# Patient Record
Sex: FEMALE | Race: WHITE | Employment: OTHER | URBAN - METROPOLITAN AREA
[De-identification: names, ages, dates, MRNs, and addresses within clinical notes are randomized per-mention and may not be internally consistent; named-entity substitution may affect disease eponyms.]

---

## 2020-06-02 ENCOUNTER — HOSPITAL ENCOUNTER (EMERGENCY)
Age: 50
Discharge: HOME | End: 2020-06-02
Attending: EMERGENCY MEDICINE
Payer: COMMERCIAL

## 2020-06-02 ENCOUNTER — APPOINTMENT (OUTPATIENT)
Dept: GENERAL RADIOLOGY | Age: 50
End: 2020-06-02
Payer: COMMERCIAL

## 2020-06-02 VITALS
SYSTOLIC BLOOD PRESSURE: 132 MMHG | HEIGHT: 69 IN | TEMPERATURE: 98 F | WEIGHT: 175 LBS | OXYGEN SATURATION: 99 % | HEART RATE: 65 BPM | DIASTOLIC BLOOD PRESSURE: 70 MMHG | BODY MASS INDEX: 25.92 KG/M2 | RESPIRATION RATE: 16 BRPM

## 2020-06-02 PROCEDURE — 6370000000 HC RX 637 (ALT 250 FOR IP): Performed by: EMERGENCY MEDICINE

## 2020-06-02 PROCEDURE — 71046 X-RAY EXAM CHEST 2 VIEWS: CPT

## 2020-06-02 PROCEDURE — 99283 EMERGENCY DEPT VISIT LOW MDM: CPT

## 2020-06-02 RX ORDER — LIDOCAINE 4 G/G
1 PATCH TOPICAL DAILY
Status: DISCONTINUED | OUTPATIENT
Start: 2020-06-02 | End: 2020-06-02 | Stop reason: HOSPADM

## 2020-06-02 RX ORDER — IBUPROFEN 600 MG/1
600 TABLET ORAL EVERY 6 HOURS PRN
Qty: 30 TABLET | Refills: 0 | Status: SHIPPED | OUTPATIENT
Start: 2020-06-02

## 2020-06-02 RX ORDER — LIDOCAINE 50 MG/G
1 PATCH TOPICAL DAILY
Qty: 30 PATCH | Refills: 0 | Status: SHIPPED | OUTPATIENT
Start: 2020-06-02

## 2020-06-02 RX ORDER — IBUPROFEN 400 MG/1
800 TABLET ORAL ONCE
Status: COMPLETED | OUTPATIENT
Start: 2020-06-02 | End: 2020-06-02

## 2020-06-02 RX ADMIN — IBUPROFEN 800 MG: 400 TABLET, FILM COATED ORAL at 15:48

## 2020-06-02 ASSESSMENT — PAIN - FUNCTIONAL ASSESSMENT: PAIN_FUNCTIONAL_ASSESSMENT: ACTIVITIES ARE NOT PREVENTED

## 2020-06-02 ASSESSMENT — PAIN DESCRIPTION - PAIN TYPE: TYPE: ACUTE PAIN

## 2020-06-02 ASSESSMENT — PAIN SCALES - GENERAL
PAINLEVEL_OUTOF10: 8
PAINLEVEL_OUTOF10: 8

## 2020-06-02 ASSESSMENT — PAIN DESCRIPTION - DESCRIPTORS: DESCRIPTORS: ACHING;SORE

## 2020-06-02 ASSESSMENT — PAIN DESCRIPTION - ONSET: ONSET: SUDDEN

## 2020-06-02 ASSESSMENT — PAIN DESCRIPTION - PROGRESSION: CLINICAL_PROGRESSION: GRADUALLY WORSENING
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
## 2020-12-12 ENCOUNTER — APPOINTMENT (OUTPATIENT)
Dept: GENERAL RADIOLOGY | Age: 50
End: 2020-12-12
Payer: COMMERCIAL

## 2020-12-12 ENCOUNTER — HOSPITAL ENCOUNTER (EMERGENCY)
Age: 50
Discharge: HOME | End: 2020-12-12
Attending: STUDENT IN AN ORGANIZED HEALTH CARE EDUCATION/TRAINING PROGRAM
Payer: COMMERCIAL

## 2020-12-12 VITALS
OXYGEN SATURATION: 100 % | WEIGHT: 170 LBS | BODY MASS INDEX: 25.18 KG/M2 | TEMPERATURE: 98.9 F | SYSTOLIC BLOOD PRESSURE: 145 MMHG | HEART RATE: 59 BPM | HEIGHT: 69 IN | RESPIRATION RATE: 18 BRPM | DIASTOLIC BLOOD PRESSURE: 94 MMHG

## 2020-12-12 PROCEDURE — 6370000000 HC RX 637 (ALT 250 FOR IP): Performed by: STUDENT IN AN ORGANIZED HEALTH CARE EDUCATION/TRAINING PROGRAM

## 2020-12-12 PROCEDURE — 73562 X-RAY EXAM OF KNEE 3: CPT

## 2020-12-12 PROCEDURE — 73610 X-RAY EXAM OF ANKLE: CPT

## 2020-12-12 PROCEDURE — 73630 X-RAY EXAM OF FOOT: CPT

## 2020-12-12 PROCEDURE — 99284 EMERGENCY DEPT VISIT MOD MDM: CPT

## 2020-12-12 RX ORDER — GINSENG 100 MG
CAPSULE ORAL ONCE
Status: COMPLETED | OUTPATIENT
Start: 2020-12-12 | End: 2020-12-12

## 2020-12-12 RX ORDER — OXYCODONE HYDROCHLORIDE 5 MG/1
5 TABLET ORAL EVERY 6 HOURS PRN
Qty: 10 TABLET | Refills: 0 | Status: SHIPPED | OUTPATIENT
Start: 2020-12-12 | End: 2020-12-15

## 2020-12-12 RX ORDER — OSTOMY ADHESIVE
1 STRIP MISCELLANEOUS ONCE
Status: DISCONTINUED | OUTPATIENT
Start: 2020-12-12 | End: 2020-12-12

## 2020-12-12 RX ORDER — ACETAMINOPHEN 325 MG/1
650 TABLET ORAL ONCE
Status: COMPLETED | OUTPATIENT
Start: 2020-12-12 | End: 2020-12-12

## 2020-12-12 RX ORDER — OXYCODONE HYDROCHLORIDE 5 MG/1
5 TABLET ORAL ONCE
Status: COMPLETED | OUTPATIENT
Start: 2020-12-12 | End: 2020-12-12

## 2020-12-12 RX ADMIN — BACITRACIN: 500 OINTMENT TOPICAL at 18:15

## 2020-12-12 RX ADMIN — OXYCODONE HYDROCHLORIDE 5 MG: 5 TABLET ORAL at 17:43

## 2020-12-12 RX ADMIN — ACETAMINOPHEN 650 MG: 325 TABLET ORAL at 16:37

## 2020-12-12 ASSESSMENT — PAIN SCALES - GENERAL
PAINLEVEL_OUTOF10: 9

## 2020-12-12 ASSESSMENT — ENCOUNTER SYMPTOMS
EYES NEGATIVE: 1
GASTROINTESTINAL NEGATIVE: 1
SHORTNESS OF BREATH: 0
RESPIRATORY NEGATIVE: 1
ALLERGIC/IMMUNOLOGIC NEGATIVE: 1
COUGH: 0

## 2020-12-12 ASSESSMENT — PAIN DESCRIPTION - ORIENTATION: ORIENTATION: LEFT

## 2020-12-12 ASSESSMENT — PAIN DESCRIPTION - LOCATION
LOCATION: KNEE
LOCATION_2: FOOT

## 2020-12-12 ASSESSMENT — PAIN DESCRIPTION - INTENSITY: RATING_2: 7

## 2020-12-12 ASSESSMENT — PAIN DESCRIPTION - DESCRIPTORS: DESCRIPTORS_2: ACHING

## 2020-12-12 ASSESSMENT — PAIN DESCRIPTION - PAIN TYPE: TYPE: ACUTE PAIN

## 2020-12-12 NOTE — ED PROVIDER NOTES
LEVOTHYROXINE (SYNTHROID) 200 MCG TABLET    Take 200 mcg by mouth Daily    LEVOTHYROXINE (SYNTHROID) 25 MCG TABLET    Take 25 mcg by mouth Daily    LIDOCAINE (LIDODERM) 5 %    Place 1 patch onto the skin daily 12 hours on, 12 hours off. OMEPRAZOLE (PRILOSEC) 20 MG CAPSULE    Take 40 mg by mouth daily       Allergies     She has No Known Allergies. Physical Exam     INITIAL VITALS: BP: (!) 145/94, Temp: 98.9 °F (37.2 °C), Pulse: 59, Resp: 18, SpO2: 100 %   Physical Exam  Vitals signs reviewed. Constitutional:       General: She is not in acute distress. Appearance: She is normal weight. HENT:      Head: Normocephalic and atraumatic. Mouth/Throat:      Mouth: Mucous membranes are moist.   Eyes:      General: No scleral icterus. Neck:      Musculoskeletal: Normal range of motion and neck supple. Cardiovascular:      Rate and Rhythm: Normal rate and regular rhythm. Pulmonary:      Effort: Pulmonary effort is normal. No respiratory distress. Breath sounds: Normal breath sounds. No wheezing. Abdominal:      General: There is no distension. Palpations: Abdomen is soft. Tenderness: There is no abdominal tenderness. There is no guarding. Musculoskeletal:      Comments: Tenderness to palpation over the left knee With abrasions. No obvious swelling. Swelling over the lateral aspect of the right foot with tenderness to palpation. No tenderness over the right ankle with full range of motion. Skin:     General: Skin is warm and dry. Neurological:      Mental Status: She is alert and oriented to person, place, and time. DiagnosticResults     EKG  Not done. RADIOLOGY:  XR FOOT RIGHT (MIN 3 VIEWS)   Final Result   1. Mildly displaced fracture of the base of the fifth metatarsal with adjacent soft tissue swelling. XR ANKLE RIGHT (MIN 3 VIEWS)   Final Result   1. No acute osseous abnormality.       XR KNEE LEFT (3 VIEWS)   Final Result 1. Vertically oriented fracture through the patella with hemorrhagic joint effusion. LABS:   No results found for this visit on 12/12/20. ED BEDSIDE ULTRASOUND:  None    RECENT VITALS:  BP: (!) 145/94, Temp: 98.9 °F (37.2 °C), Pulse: 59,Resp: 18, SpO2: 100 %     Procedures     None    ED Course     Nursing Notes, Past Medical Hx, Past Surgical Hx, Social Hx, Allergies, and Family Hx were reviewed. The patient was given the followingmedications:  Orders Placed This Encounter   Medications    acetaminophen (TYLENOL) tablet 650 mg    DISCONTD: Unna-Flex Elastic Unna Boot MISC 1 each    oxyCODONE (ROXICODONE) immediate release tablet 5 mg    oxyCODONE (ROXICODONE) 5 MG immediate release tablet     Sig: Take 1 tablet by mouth every 6 hours as needed for Pain for up to 3 days. WARNING:  May cause drowsiness. May impair ability to operate vehicles or machinery. Do not use in combination with alcohol.      Dispense:  10 tablet     Refill:  0    bacitracin ointment       CONSULTS:  IP CONSULT TO [address] / Maile Mchugh / Karely De León Mindy Sterling is a 52 y.o. female who presents for evaluation after a fall. It was a mechanical fall where she fell on her left kneecap, twisting her right foot. X-rays were done of the left knee and showed a vertical fracture through her kneecap. X-ray of her right foot showed a fracture of the base of her fifth metatarsal.  Orthotic surgery was contacted given bilateral fractures. They recommended a knee immobilizer on the left with weightbearing as tolerated. They recommended a boot on the right foot with weightbearing as tolerated on the heel. They saw the patient and she department and clinically indicated these findings/recommendations. The patient was given the number to call and schedule an appointment with orthopedic surgery in one week for reevaluation and repeat x-rays. She was understanding of this plan. She was given crutches to assist with ambulation. Tetanus was up-to-date so was not given. The wound on her left knee was cleaned. She was discharged in stable condition. This patient was also evaluated by the attending physician. All care plans werediscussed and agreed upon. Clinical Impression     1. Closed nondisplaced longitudinal fracture of left patella, initial encounter    2. Closed avulsion fracture of metatarsal bone of right foot, initial encounter        Disposition     PATIENT REFERRED TO:  José Miguel Butcher MD  [address] [place] [address]  [phone]    Schedule an appointment as soon as possible for a visit in 1 week  To have repeat xrays done of your left knee and right ankle. Please call their office monday to schedule an appointment. DISCHARGE MEDICATIONS:  New Prescriptions    OXYCODONE (ROXICODONE) 5 MG IMMEDIATE RELEASE TABLET    Take 1 tablet by mouth every 6 hours as needed for Pain for up to 3 days. WARNING:  May cause drowsiness. May impair ability to operate vehicles or machinery. Do not use in combination with alcohol. DISPOSITION Decision To Discharge 12/12/2020 05:28:38 PM        Javier Tejada MD  Resident  12/12/20 8595

## 2020-12-12 NOTE — ED NOTES
Pt is alert and oriented times four. Pt here today for c/o of a fall. Pt states she went outside and was tangled up by a dog leash and went down. Pt has pain to left knee and rightt foot. Pt states the pain and numbness is from her knee up to her upper leg. Pt has swelling an abrasion  to her left knee. Pt's right foot. Has a swollen not to the outside of her right foot. Ice applied to effected to areas. Call light in reach will continue to monitor.       Slim Alvarez RN  12/12/20 7940

## 2020-12-12 NOTE — ED NOTES
Bed: B15-15  Expected date:   Expected time:   Means of arrival:   Comments:                                 cece Moran RN  12/12/20 [id number]

## 2020-12-12 NOTE — CONSULTS
Justus Prieto MD  [place] Office: [address] [facility], [address], [address] [facility] [address] 79 (1965) Pop      Inpatient consult to Orthopedic Surgery  Consult performed by: Mary Ellen Rowan MD  Consult ordered by: Jocelynn Ronquillo MD  Reason for consult: Left knee and right foot pain  Assessment/Recommendations:   Left non-displaced vertical patella fracture  - extensor mechanism intact, good apposition on x ray  - plan for non-operative treatment  - WBAT LLE in knee immobilizer, keep knee in extension at all times  - Local wound care for superficial abrasion    Right minimally displaced zone 1 base of 5th MT fracture  - Expect good results with non-operative treatment  - OK for WBAT through the heel RLE    Follow up in my clinic later this week for repeat x rays of left knee and right foot  [phone] to schedule  Follow up info given to patient and placed in discharge info        Chief Complaint:  Left knee and right foot pain    HPI:  52 y.o. female smoker who presents after a fall when she got tangled in her dog's leash. Now has severe, acute, sharp, aching, nonradiating left knee pain, worse with movement and relieved by rest. She also has right foot pain along the lateral aspect of the midfoot with associated swelling. She denies numbness, tingling, fevers, chills, chest pain, shortness of breath or any other acute symptoms or injuries    Her health is otherwise significant for factor 5 leiden and hyperthyroidism      Past Medical History:   Diagnosis Date    Broken jaw ([address]     Goiter     Thyroid disease     Ulcer     abd       Past Surgical History:   Procedure Laterality Date     SECTION      x4    LAPAROTOMY         Social History     Tobacco Use    Smoking status: Current Every Day Smoker     Packs/day: 1.50     Types: Cigarettes    Smokeless tobacco: Never Used   Substance Use Topics    Alcohol use: No    Drug use: No       family history is not on file. Prior to Admission medications    Medication Sig Start Date End Date Taking? Authorizing Provider   oxyCODONE (ROXICODONE) 5 MG immediate release tablet Take 1 tablet by mouth every 6 hours as needed for Pain for up to 3 days. WARNING:  May cause drowsiness. May impair ability to operate vehicles or machinery. Do not use in combination with alcohol. 12/12/20 12/15/20 Yes Pro Haji MD   ibuprofen (ADVIL;MOTRIN) 600 MG tablet Take 1 tablet by mouth every 6 hours as needed for Pain 6/2/20   Fabi Jessica MD   lidocaine (LIDODERM) 5 % Place 1 patch onto the skin daily 12 hours on, 12 hours off. 6/2/20   Fabi Jessica MD   levothyroxine (SYNTHROID) 25 MCG tablet Take 25 mcg by mouth Daily    Historical Provider, MD   HYDROcodone-acetaminophen (NORCO) 5-325 MG per tablet Take 1 tablet by mouth every 6 hours as needed for Pain 2/27/16   CARISSA Pate   omeprazole (PRILOSEC) 20 MG capsule Take 40 mg by mouth daily    Historical Provider, MD   levothyroxine (SYNTHROID) 200 MCG tablet Take 200 mcg by mouth Daily    Historical Provider, MD       No Known Allergies     Review of Systems   Constitutional: Negative. HENT: Negative. Eyes: Negative. Respiratory: Negative. Cardiovascular: Negative. Gastrointestinal: Negative. Endocrine: Negative. Genitourinary: Negative. Musculoskeletal: Positive for arthralgias. Skin: Negative. Allergic/Immunologic: Negative. Neurological: Negative. Hematological: Negative. Psychiatric/Behavioral: Negative. Physical Examination:  BP (!) 145/94   Pulse 59   Temp 98.9 °F (37.2 °C)   Resp 18   Ht 5' 9\" (1.753 m)   Wt 170 lb (77.1 kg)   LMP 02/08/2016   SpO2 100%   BMI 25.10 kg/m²     Physical Exam  Vitals signs reviewed. Constitutional:       Appearance: Normal appearance. She is normal weight. HENT:      Head: Normocephalic and atraumatic.       Mouth/Throat:      Mouth: Mucous membranes are moist. Pharynx: Oropharynx is clear. Neck:      Musculoskeletal: Normal range of motion and neck supple. No muscular tenderness. Cardiovascular:      Rate and Rhythm: Normal rate and regular rhythm. Pulses: Normal pulses. Pulmonary:      Effort: Pulmonary effort is normal. No respiratory distress. Abdominal:      General: Abdomen is flat. Palpations: Abdomen is soft. Tenderness: There is no abdominal tenderness. Skin:     General: Skin is warm and dry. Capillary Refill: Capillary refill takes less than 2 seconds. Neurological:      General: No focal deficit present. Mental Status: She is alert and oriented to person, place, and time. Mental status is at baseline. Psychiatric:         Mood and Affect: Mood normal.         Behavior: Behavior normal.         Thought Content: Thought content normal.         Judgment: Judgment normal.       Left knee exam:  Skin: superficial abraion  Inspection: mild swelling  Palpation: tender over the patella, moderate effusion  Range of motion: not tolerated at the knee  Strength: Able to maintain straight leg raise,  5/5 throughout distally  Stability: No gross instability    Right foot exam:  Skin: no lesions  Inspection: significant small hematoma  Palpation: tender over the 5th MT base  Range of motion: good  Strength: 4+5/ eversion/df/pf/ehl/fhl  Stability: No gross instability      Imaging:  X ray images of the left knee were reviewed and interpreted by me today and are significant for:  Nondisplaced vertical patella fracture    X ray images of the Right foot were reviewed and interpreted by me today and are significant for:  Zone 1 alfonzo 5th MT fracture    MDM:  Multiple new complaints  Imaging and reports reviewed  Plan for non-operative fracture management x2        ZAIN.  Maty Obando MD  [facility]  Office: [phone]  Cell: [phone]    12/12/20  5:33 PM

## 2020-12-12 NOTE — ED PROVIDER NOTES
ED Attending Attestation Note     Date of evaluation: 12/12/2020    This patient was seen by the resident. I have seen and examined the patient, agree with the workup, evaluation, management and diagnosis. The care plan has been discussed. My assessment reveals 51 y/o F who presents to the ED for right foot pain after she fell while walking her dog. Denies head trauma or LOC. She is able to ambulate. Complains of pain to lateral right foot. Localized area of tenderness and swelling at base of right fifth metatarsal.  Xrays shows right 5th metatarsal fx and left patella fx. Orthopedic surgery consulted and evaluated patient in the ED. Recommends left knee immbolizer WBAT and right boot WBAT. Will follow up in clinic. Given crutches to assist with ambulation. ED precautions for worsening symptoms.      Gretta Michael MD  12/12/20 1145

## 2023-03-20 ENCOUNTER — APPOINTMENT (OUTPATIENT)
Dept: GENERAL RADIOLOGY | Age: 53
End: 2023-03-20
Payer: COMMERCIAL

## 2023-03-20 ENCOUNTER — APPOINTMENT (OUTPATIENT)
Dept: CT IMAGING | Age: 53
End: 2023-03-20
Payer: COMMERCIAL

## 2023-03-20 ENCOUNTER — HOSPITAL ENCOUNTER (EMERGENCY)
Age: 53
Discharge: HOME | End: 2023-03-21
Payer: COMMERCIAL

## 2023-03-20 DIAGNOSIS — I95.89 OTHER SPECIFIED HYPOTENSION: ICD-10-CM

## 2023-03-20 DIAGNOSIS — S92.355A NONDISPLACED FRACTURE OF FIFTH METATARSAL BONE, LEFT FOOT, INITIAL ENCOUNTER FOR CLOSED FRACTURE: Primary | ICD-10-CM

## 2023-03-20 DIAGNOSIS — D72.829 LEUKOCYTOSIS, UNSPECIFIED TYPE: ICD-10-CM

## 2023-03-20 DIAGNOSIS — J32.0 CHRONIC LEFT MAXILLARY SINUSITIS: ICD-10-CM

## 2023-03-20 LAB
ALBUMIN SERPL-MCNC: 4.4 G/DL (ref 3.4–5)
ALBUMIN/GLOB SERPL: 1.3 {RATIO} (ref 1.1–2.2)
ALP SERPL-CCNC: 69 U/L (ref 40–129)
ALT SERPL-CCNC: 23 U/L (ref 10–40)
ANION GAP SERPL CALCULATED.3IONS-SCNC: 11 MMOL/L (ref 3–16)
AST SERPL-CCNC: 52 U/L (ref 15–37)
BASOPHILS # BLD: 0.1 K/UL (ref 0–0.2)
BASOPHILS NFR BLD: 0.7 %
BILIRUB SERPL-MCNC: 0.3 MG/DL (ref 0–1)
BUN SERPL-MCNC: 12 MG/DL (ref 7–20)
CALCIUM SERPL-MCNC: 9.2 MG/DL (ref 8.3–10.6)
CHLORIDE SERPL-SCNC: 97 MMOL/L (ref 99–110)
CO2 SERPL-SCNC: 27 MMOL/L (ref 21–32)
CREAT SERPL-MCNC: 1.1 MG/DL (ref 0.6–1.1)
DEPRECATED RDW RBC AUTO: 13.8 % (ref 12.4–15.4)
EOSINOPHIL # BLD: 0.1 K/UL (ref 0–0.6)
EOSINOPHIL NFR BLD: 0.8 %
GFR SERPLBLD CREATININE-BSD FMLA CKD-EPI: >60 ML/MIN/{1.73_M2}
GLUCOSE SERPL-MCNC: 126 MG/DL (ref 70–99)
HCT VFR BLD AUTO: 36.1 % (ref 36–48)
HGB BLD-MCNC: 12.3 G/DL (ref 12–16)
LYMPHOCYTES # BLD: 2.6 K/UL (ref 1–5.1)
LYMPHOCYTES NFR BLD: 19.5 %
MCH RBC QN AUTO: 32.6 PG (ref 26–34)
MCHC RBC AUTO-ENTMCNC: 34 G/DL (ref 31–36)
MCV RBC AUTO: 95.9 FL (ref 80–100)
MONOCYTES # BLD: 0.7 K/UL (ref 0–1.3)
MONOCYTES NFR BLD: 5.1 %
NEUTROPHILS # BLD: 9.8 K/UL (ref 1.7–7.7)
NEUTROPHILS NFR BLD: 73.9 %
PLATELET # BLD AUTO: 339 K/UL (ref 135–450)
PMV BLD AUTO: 7.8 FL (ref 5–10.5)
POTASSIUM SERPL-SCNC: 3.8 MMOL/L (ref 3.5–5.1)
PROT SERPL-MCNC: 7.8 G/DL (ref 6.4–8.2)
RBC # BLD AUTO: 3.77 M/UL (ref 4–5.2)
SODIUM SERPL-SCNC: 135 MMOL/L (ref 136–145)
TROPONIN T SERPL-MCNC: <0.01 NG/ML
WBC # BLD AUTO: 13.2 K/UL (ref 4–11)

## 2023-03-20 PROCEDURE — 99284 EMERGENCY DEPT VISIT MOD MDM: CPT

## 2023-03-20 PROCEDURE — 85025 COMPLETE CBC W/AUTO DIFF WBC: CPT

## 2023-03-20 PROCEDURE — 84484 ASSAY OF TROPONIN QUANT: CPT

## 2023-03-20 PROCEDURE — 6370000000 HC RX 637 (ALT 250 FOR IP): Performed by: NURSE PRACTITIONER

## 2023-03-20 PROCEDURE — 36415 COLL VENOUS BLD VENIPUNCTURE: CPT

## 2023-03-20 PROCEDURE — 80053 COMPREHEN METABOLIC PANEL: CPT

## 2023-03-20 PROCEDURE — 71046 X-RAY EXAM CHEST 2 VIEWS: CPT

## 2023-03-20 PROCEDURE — 70450 CT HEAD/BRAIN W/O DYE: CPT

## 2023-03-20 PROCEDURE — 73630 X-RAY EXAM OF FOOT: CPT

## 2023-03-20 PROCEDURE — 29515 APPLICATION SHORT LEG SPLINT: CPT

## 2023-03-20 RX ORDER — ACETAMINOPHEN 500 MG
1000 TABLET ORAL ONCE
Status: COMPLETED | OUTPATIENT
Start: 2023-03-20 | End: 2023-03-20

## 2023-03-20 RX ADMIN — ACETAMINOPHEN 1000 MG: 500 TABLET ORAL at 22:42

## 2023-03-20 ASSESSMENT — PAIN DESCRIPTION - DESCRIPTORS: DESCRIPTORS: ACHING

## 2023-03-20 ASSESSMENT — PAIN DESCRIPTION - ORIENTATION
ORIENTATION: LEFT
ORIENTATION: LEFT

## 2023-03-20 ASSESSMENT — PAIN DESCRIPTION - LOCATION
LOCATION: FOOT
LOCATION: FOOT

## 2023-03-20 ASSESSMENT — PAIN SCALES - GENERAL
PAINLEVEL_OUTOF10: 10
PAINLEVEL_OUTOF10: 10

## 2023-03-20 ASSESSMENT — PAIN - FUNCTIONAL ASSESSMENT: PAIN_FUNCTIONAL_ASSESSMENT: 0-10

## 2023-03-21 VITALS
SYSTOLIC BLOOD PRESSURE: 134 MMHG | HEART RATE: 60 BPM | TEMPERATURE: 97 F | DIASTOLIC BLOOD PRESSURE: 76 MMHG | OXYGEN SATURATION: 98 % | RESPIRATION RATE: 16 BRPM

## 2023-03-21 LAB — LACTATE BLDV-SCNC: 1 MMOL/L (ref 0.4–2)

## 2023-03-21 PROCEDURE — 83605 ASSAY OF LACTIC ACID: CPT

## 2023-03-21 RX ORDER — AMOXICILLIN 500 MG/1
500 CAPSULE ORAL 3 TIMES DAILY
Qty: 21 CAPSULE | Refills: 0 | Status: SHIPPED | OUTPATIENT
Start: 2023-03-21 | End: 2023-03-28

## 2023-03-21 RX ORDER — ACETAMINOPHEN 500 MG
500 TABLET ORAL 4 TIMES DAILY PRN
Qty: 28 TABLET | Refills: 0 | Status: SHIPPED | OUTPATIENT
Start: 2023-03-21 | End: 2023-03-28

## 2023-03-21 RX ORDER — TRAMADOL HYDROCHLORIDE 50 MG/1
50 TABLET ORAL EVERY 8 HOURS PRN
Qty: 9 TABLET | Refills: 0 | Status: SHIPPED | OUTPATIENT
Start: 2023-03-21 | End: 2023-03-24

## 2023-03-21 ASSESSMENT — PAIN SCALES - GENERAL: PAINLEVEL_OUTOF10: 0

## 2023-03-21 NOTE — ED TRIAGE NOTES
Left foot pain. Was walking about 3 hours ago and rolled over right foot and fell. Patient BP is 83/54, patient appears sightly drowsy but oriented x 4. Provider aware.

## 2023-03-21 NOTE — DISCHARGE INSTRUCTIONS
Nonweightbearing. Utilize crutches. Keep your splint clean and dry. Follow-up with his referral service line or to establish a PCP for your future nonemergent medical needs. Call the orthopedic specialist on tomorrow to schedule follow-up appointment for evaluation of the closed proximal left fifth metatarsal fracture. You have a chronic sinusitis and questionable acute sinusitis. You state you are feeling foggy and this may be the reason why. I prescribed an antibiotic for this diagnosis. Please complete the full course of the medication without missing doses. Follow-up with the physician referral service line or to establish a PCP for your future nonemergent medical needs.

## 2023-03-22 ASSESSMENT — ENCOUNTER SYMPTOMS
COUGH: 0
ANAL BLEEDING: 0
DIARRHEA: 0
ABDOMINAL PAIN: 0
NAUSEA: 0
BACK PAIN: 0
EYE PAIN: 0
VOMITING: 0
SHORTNESS OF BREATH: 0
SORE THROAT: 0

## 2023-03-23 NOTE — ED PROVIDER NOTES
[address] [facility]        Pt Name: Angelica Hess  MRN: [id number]  [place] 1970  Date of evaluation: 3/20/2023  Provider: SUE Junior CNP  PCP: No primary care provider on file. Note Started: 9:14 PM EDT 3/22/23      OLAYINKA. I have evaluated this patient. My supervising physician was available for consultation. CHIEF COMPLAINT       Chief Complaint   Patient presents with    Foot Injury     Left foot pain. Was walking about 3 hours ago and rolled over right foot and fell. HISTORY OF PRESENT ILLNESS: 1 or more Elements     History from : Patient    Limitations to history : None    Angelica Hess is a 50 y.o. nontoxic, well-appearing, mildly distressed female who presents to the emergency department status post she experienced an injury to her left mid foot. She describes the pain as 9/10. She states she does not really know how she injured herself. She does endorse a bit of fuzzy memory. She was found to be hypotensive 83/54 mmHg. She states she feels that she is in a fog. She is having difficulty weightbearing. She states that she is unsure if she actually fell to the ground. Nursing Notes were all reviewed and agreed with or any disagreements were addressed in the HPI. REVIEW OF SYSTEMS :      Review of Systems   Constitutional:  Negative for chills, diaphoresis, fatigue and fever. HENT:  Negative for congestion and sore throat. Eyes:  Negative for pain and visual disturbance. Respiratory:  Negative for cough and shortness of breath. Cardiovascular:  Negative for chest pain and leg swelling. Gastrointestinal:  Negative for abdominal pain, anal bleeding, diarrhea, nausea and vomiting. Genitourinary:  Negative for difficulty urinating, dysuria, frequency and urgency. Musculoskeletal:  Positive for arthralgias (Left foot pain). Negative for back pain and neck pain.    Skin:  Negative for